# Patient Record
Sex: FEMALE | Race: WHITE | NOT HISPANIC OR LATINO | Employment: UNEMPLOYED | URBAN - NONMETROPOLITAN AREA
[De-identification: names, ages, dates, MRNs, and addresses within clinical notes are randomized per-mention and may not be internally consistent; named-entity substitution may affect disease eponyms.]

---

## 2023-05-02 ENCOUNTER — OFFICE VISIT (OUTPATIENT)
Dept: PRIMARY CARE | Facility: CLINIC | Age: 58
End: 2023-05-02

## 2023-05-02 VITALS
OXYGEN SATURATION: 96 % | WEIGHT: 235 LBS | SYSTOLIC BLOOD PRESSURE: 124 MMHG | DIASTOLIC BLOOD PRESSURE: 72 MMHG | HEART RATE: 82 BPM | TEMPERATURE: 97.7 F

## 2023-05-02 DIAGNOSIS — E78.5 DYSLIPIDEMIA: ICD-10-CM

## 2023-05-02 DIAGNOSIS — Z86.73 HISTORY OF ISCHEMIC STROKE: Primary | ICD-10-CM

## 2023-05-02 PROCEDURE — 1036F TOBACCO NON-USER: CPT | Performed by: FAMILY MEDICINE

## 2023-05-02 PROCEDURE — 99203 OFFICE O/P NEW LOW 30 MIN: CPT | Performed by: FAMILY MEDICINE

## 2023-05-02 RX ORDER — ASPIRIN 81 MG/1
81 TABLET ORAL DAILY
COMMUNITY

## 2023-05-02 ASSESSMENT — ENCOUNTER SYMPTOMS
WHEEZING: 0
LIGHT-HEADEDNESS: 0
MYALGIAS: 0
DYSURIA: 0
NUMBNESS: 0
JOINT SWELLING: 0
DIARRHEA: 0
WEAKNESS: 0
NERVOUS/ANXIOUS: 0
HEMATURIA: 0
HEADACHES: 0
VOMITING: 0
SINUS PRESSURE: 0
FACIAL ASYMMETRY: 0
EYE DISCHARGE: 0
FLANK PAIN: 0
BLOOD IN STOOL: 0
SORE THROAT: 0
EYE ITCHING: 0
COUGH: 0
APPETITE CHANGE: 0
SLEEP DISTURBANCE: 0
NAUSEA: 0
ARTHRALGIAS: 0
UNEXPECTED WEIGHT CHANGE: 0
PALPITATIONS: 0
FEVER: 0
DIZZINESS: 0
RHINORRHEA: 0
ABDOMINAL PAIN: 0
CONSTIPATION: 0
ACTIVITY CHANGE: 0
DYSPHORIC MOOD: 0
SHORTNESS OF BREATH: 0

## 2023-05-02 NOTE — PATIENT INSTRUCTIONS
SEE IN 6 MONTHS   LIPIDS NEED FOLLOWED  LOWER FAT DIET AND DAILY EXERCISE  MORE FRUIT AND VEGETABLES

## 2023-05-02 NOTE — PROGRESS NOTES
"Subjective   Patient ID: Filomena Yu is a 58 y.o. female who presents for Results (Echo at [facility]).    HPI PATIENT HAD A STROKE ON 3-24TH  \"[facility]\" AND HAS AMNESIA OF THAT DAY   HEART MONITOR BACK /DR GASCA  NEEDS TO CALL TO SEE HIM (SHE IS SELF PAY )  ALSO HAD AN ECHO AND I REVIEWED THIS WITH THE PATIENT AND THERE IS NO PATENT FORAMEN /NO ATRIAL CLOTS AND NO VALVULAR HEART DISEASE   LIPIDS TO TOO HIGH/DIETARY CHANGES AND TAKE THE ASA    Review of Systems   Constitutional:  Negative for activity change, appetite change, fever and unexpected weight change.   HENT:  Negative for congestion, ear pain, postnasal drip, rhinorrhea, sinus pressure and sore throat.    Eyes:  Negative for discharge, itching and visual disturbance.   Respiratory:  Negative for cough, shortness of breath and wheezing.    Cardiovascular:  Negative for chest pain, palpitations and leg swelling.   Gastrointestinal:  Negative for abdominal pain, blood in stool, constipation, diarrhea, nausea and vomiting.   Endocrine: Negative for cold intolerance, heat intolerance and polyuria.   Genitourinary:  Negative for dysuria, flank pain and hematuria.   Musculoskeletal:  Negative for arthralgias, gait problem, joint swelling and myalgias.   Skin:  Negative for rash.   Allergic/Immunologic: Negative for environmental allergies and food allergies.   Neurological:  Negative for dizziness, syncope, facial asymmetry, weakness, light-headedness, numbness and headaches.        SEE HPI   Psychiatric/Behavioral:  Negative for dysphoric mood and sleep disturbance. The patient is not nervous/anxious.        Objective   /72   Pulse 82   Temp 36.5 °C (97.7 °F)   Wt 107 kg (235 lb)   SpO2 96%     Physical Exam  Vitals reviewed.   Constitutional:       Appearance: Normal appearance. She is obese.   HENT:      Head: Normocephalic and atraumatic.      Mouth/Throat:      Mouth: Mucous membranes are moist.   Eyes:      Extraocular Movements: " Extraocular movements intact.      Pupils: Pupils are equal, round, and reactive to light.   Cardiovascular:      Rate and Rhythm: Normal rate and regular rhythm.   Pulmonary:      Effort: Pulmonary effort is normal.      Breath sounds: Normal breath sounds.   Abdominal:      Palpations: Abdomen is soft. There is no mass.   Musculoskeletal:         General: Normal range of motion.      Cervical back: Normal range of motion and neck supple.   Skin:     General: Skin is warm and dry.   Neurological:      General: No focal deficit present.      Mental Status: She is alert and oriented to person, place, and time.      Cranial Nerves: No cranial nerve deficit.      Sensory: No sensory deficit.      Motor: No weakness.      Coordination: Coordination normal.      Gait: Gait normal.      Deep Tendon Reflexes: Reflexes normal.   Psychiatric:         Mood and Affect: Mood normal.         Behavior: Behavior normal.       Assessment/Plan   Diagnoses and all orders for this visit:  History of ischemic stroke  Dyslipidemia

## 2023-08-04 ENCOUNTER — OFFICE VISIT (OUTPATIENT)
Dept: PRIMARY CARE | Facility: CLINIC | Age: 58
End: 2023-08-04

## 2023-08-04 VITALS
TEMPERATURE: 98.5 F | DIASTOLIC BLOOD PRESSURE: 70 MMHG | SYSTOLIC BLOOD PRESSURE: 128 MMHG | OXYGEN SATURATION: 94 % | WEIGHT: 234 LBS | HEART RATE: 72 BPM

## 2023-08-04 DIAGNOSIS — R30.0 BURNING WITH URINATION: ICD-10-CM

## 2023-08-04 DIAGNOSIS — N30.01 ACUTE CYSTITIS WITH HEMATURIA: Primary | ICD-10-CM

## 2023-08-04 LAB
POC APPEARANCE, URINE: ABNORMAL
POC BILIRUBIN, URINE: NEGATIVE
POC BLOOD, URINE: ABNORMAL
POC COLOR, URINE: YELLOW
POC GLUCOSE, URINE: NEGATIVE MG/DL
POC KETONES, URINE: NEGATIVE MG/DL
POC LEUKOCYTES, URINE: ABNORMAL
POC NITRITE,URINE: NEGATIVE
POC PH, URINE: 6.5 PH
POC PROTEIN, URINE: NEGATIVE MG/DL
POC SPECIFIC GRAVITY, URINE: 1.01
POC UROBILINOGEN, URINE: 0.2 EU/DL

## 2023-08-04 PROCEDURE — 99213 OFFICE O/P EST LOW 20 MIN: CPT

## 2023-08-04 PROCEDURE — 87186 SC STD MICRODIL/AGAR DIL: CPT

## 2023-08-04 PROCEDURE — 87077 CULTURE AEROBIC IDENTIFY: CPT

## 2023-08-04 PROCEDURE — 1036F TOBACCO NON-USER: CPT

## 2023-08-04 PROCEDURE — 87086 URINE CULTURE/COLONY COUNT: CPT

## 2023-08-04 PROCEDURE — 81003 URINALYSIS AUTO W/O SCOPE: CPT

## 2023-08-04 RX ORDER — NITROFURANTOIN 25; 75 MG/1; MG/1
100 CAPSULE ORAL 2 TIMES DAILY
Qty: 10 CAPSULE | Refills: 0 | Status: SHIPPED | OUTPATIENT
Start: 2023-08-04 | End: 2023-08-09

## 2023-08-04 ASSESSMENT — ENCOUNTER SYMPTOMS
NAUSEA: 0
FREQUENCY: 1
SWEATS: 0
HEMATURIA: 1
VOMITING: 0
DYSURIA: 1
CHILLS: 0
FLANK PAIN: 0

## 2023-08-04 NOTE — PROGRESS NOTES
Subjective   Patient ID: Filomena Yu is a 58 y.o. female who presents for UTI (Filomena is here for a possible UTI. Just started yesterday. Does have burning with the end of urination, did have blood yesterday(not today)).  Subjective  Filomena Yu is a 58 y.o. female who complains of burning with urination and dysuria for 5 days. Patient does have a history of recurrent UTI or pyelonephritis.    @[facility]@    Objective  /70   Pulse 72   Temp 36.9 °C (98.5 °F)   Wt 106 kg (234 lb)   SpO2 94%    General: alert and oriented, in no acute distress  Abdomen: soft, non-tender, without masses or organomegaly  Back: CVA tenderness absent    Laboratory:   Urine dipstick shows +leukocytes.      Assessment/Plan  Diagnoses and associated orders for this visit:    · Acute cystitis with hematuria   nitrofurantoin, macrocrystal-monohydrate, (Macrobid) 100 mg capsule; Take 1 capsule (100 mg) by mouth 2 times a day for 5 days.    · Burning with urination   POCT UA Automated manually resulted   Urine Culture          Difficulty Urinating   This is a new problem. The current episode started yesterday. The problem occurs intermittently. The problem has been waxing and waning. The quality of the pain is described as burning. The pain is at a severity of 5/10. There has been no fever. She is Sexually active. There is No history of pyelonephritis. Associated symptoms include frequency and hematuria. Pertinent negatives include no chills, discharge, flank pain, hesitancy, nausea, possible pregnancy, sweats, urgency or vomiting.       Vitals:    08/04/23 1144   BP: 128/70   Pulse: 72   Temp: 36.9 °C (98.5 °F)   SpO2: 94%       Review of Systems   Constitutional:  Negative for chills.   Gastrointestinal:  Negative for nausea and vomiting.   Genitourinary:  Positive for dysuria, frequency and hematuria. Negative for flank pain, hesitancy and urgency.       Objective   Physical Exam    Assessment/Plan   Problem List Items Addressed This Visit     None  Visit Diagnoses       Acute cystitis with hematuria    -  Primary    Relevant Medications    nitrofurantoin, macrocrystal-monohydrate, (Macrobid) 100 mg capsule    Burning with urination        Relevant Orders    POCT UA Automated manually resulted (Completed)    Urine Culture                 Thank you for coming in today, please call my office if you have any concerns or questions.     Elijah MCRAE, CNP

## 2023-08-07 LAB — URINE CULTURE: ABNORMAL

## 2023-08-09 ENCOUNTER — TELEPHONE (OUTPATIENT)
Dept: PRIMARY CARE | Facility: CLINIC | Age: 58
End: 2023-08-09

## 2023-08-09 NOTE — TELEPHONE ENCOUNTER
Spoke with Filomena at 12:17PM. Verbalized understanding has finished antibiotics will contact if it comes back.

## 2023-08-09 NOTE — TELEPHONE ENCOUNTER
----- Message from CLEMENTINA Doran-CNP sent at 8/9/2023  9:16 AM EDT -----  Confirmation shows Macrobid is good for the infection.

## 2023-08-18 ENCOUNTER — TELEPHONE (OUTPATIENT)
Dept: PRIMARY CARE | Facility: CLINIC | Age: 58
End: 2023-08-18

## 2023-08-18 DIAGNOSIS — N30.01 ACUTE CYSTITIS WITH HEMATURIA: Primary | ICD-10-CM

## 2023-08-18 RX ORDER — NITROFURANTOIN 25; 75 MG/1; MG/1
100 CAPSULE ORAL 2 TIMES DAILY
Qty: 10 CAPSULE | Refills: 0 | Status: SHIPPED | OUTPATIENT
Start: 2023-08-18 | End: 2023-08-23

## 2023-08-18 NOTE — TELEPHONE ENCOUNTER
Filomena called stating that she feels her UTI is starting to come back and only has 5 days of antibiotics. Would like to know if you would call in a longer dose. States you advised her just to call and you would send more over.

## 2023-10-16 ENCOUNTER — TELEPHONE (OUTPATIENT)
Dept: PRIMARY CARE | Facility: CLINIC | Age: 58
End: 2023-10-16

## 2023-10-25 DIAGNOSIS — R94.6 ABNORMAL RESULTS OF THYROID FUNCTION STUDIES: ICD-10-CM

## 2023-10-25 DIAGNOSIS — E78.5 DYSLIPIDEMIA: ICD-10-CM

## 2023-10-25 NOTE — TELEPHONE ENCOUNTER
Spoke with Filomena She is asking for an order for labs. She will go to [facility] .  Please Call when order is in

## 2023-10-30 ENCOUNTER — APPOINTMENT (OUTPATIENT)
Dept: PRIMARY CARE | Facility: CLINIC | Age: 58
End: 2023-10-30

## 2023-11-03 ENCOUNTER — APPOINTMENT (OUTPATIENT)
Dept: PRIMARY CARE | Facility: CLINIC | Age: 58
End: 2023-11-03

## 2023-11-07 ENCOUNTER — OFFICE VISIT (OUTPATIENT)
Dept: PRIMARY CARE | Facility: CLINIC | Age: 58
End: 2023-11-07

## 2023-11-07 VITALS
SYSTOLIC BLOOD PRESSURE: 130 MMHG | TEMPERATURE: 98.2 F | OXYGEN SATURATION: 97 % | DIASTOLIC BLOOD PRESSURE: 84 MMHG | WEIGHT: 232 LBS | HEART RATE: 63 BPM

## 2023-11-07 DIAGNOSIS — E78.5 DYSLIPIDEMIA: Primary | ICD-10-CM

## 2023-11-07 DIAGNOSIS — E03.9 ACQUIRED HYPOTHYROIDISM: ICD-10-CM

## 2023-11-07 PROCEDURE — 99213 OFFICE O/P EST LOW 20 MIN: CPT | Performed by: FAMILY MEDICINE

## 2023-11-07 PROCEDURE — 1036F TOBACCO NON-USER: CPT | Performed by: FAMILY MEDICINE

## 2023-11-07 ASSESSMENT — ENCOUNTER SYMPTOMS
MYALGIAS: 0
WHEEZING: 0
HEMATURIA: 0
DIARRHEA: 0
UNEXPECTED WEIGHT CHANGE: 0
DYSPHORIC MOOD: 0
PALPITATIONS: 0
EYE DISCHARGE: 0
BLOOD IN STOOL: 0
SHORTNESS OF BREATH: 0
SINUS PRESSURE: 0
ARTHRALGIAS: 0
VOMITING: 0
EYE ITCHING: 0
SLEEP DISTURBANCE: 0
RHINORRHEA: 0
COUGH: 0
JOINT SWELLING: 0
WEAKNESS: 0
FEVER: 0
NUMBNESS: 0
ABDOMINAL PAIN: 0
FLANK PAIN: 0
DIZZINESS: 0
ACTIVITY CHANGE: 0
APPETITE CHANGE: 0
NAUSEA: 0
DYSURIA: 0
SORE THROAT: 0
HEADACHES: 0
CONSTIPATION: 0
LIGHT-HEADEDNESS: 0
NERVOUS/ANXIOUS: 0

## 2023-11-07 NOTE — PATIENT INSTRUCTIONS
Consider labs in 6 months at [place] and they are ordered    Discussed dietary changes of lower fat and more fruit and vegetables     MAMMOGRAM /MAMMOVAN AT THE [facility] DEPARTMENT [phone]

## 2023-11-07 NOTE — PROGRESS NOTES
"Subjective   Patient ID: Tanya Yu is a 58 y.o. female who presents for Follow-up (6 month follow up.  Lab results in Media.).    HPI feels good most days   TANYA IS SELF PAY SO A BATTERY OF LABS WERE DONE AT [place] AND SHE IS HERE TO DISCUSS THESE   IN GENERAL ABNORMAL LIPID PANEL INCREASED LDL   DECREASED GR IN THE 70\"S STG 2  BUT MOST IMPORTANTLY IN HER THYROID THE TSH >9 AND T4 IS LOW   AFTER DISCUSSION PATIENT WILL FOLLOW IN 6 MONTHS SINCE I TOLD HER I DID NOT THINK HER THYROID WAS REMEDIABLE OTHER THAN WITH MEDICATION WHICH IS DEFERS AT THIS TIME   HER CHOLESTEROL WOULD IMPROVE I SUSPECT WITH IMPROVED DIETARY MEASURES THAT ARE ALSO DISCUSSED     Review of Systems   Constitutional:  Negative for activity change, appetite change, fever and unexpected weight change.   HENT:  Negative for congestion, ear pain, postnasal drip, rhinorrhea, sinus pressure and sore throat.    Eyes:  Negative for discharge, itching and visual disturbance.   Respiratory:  Negative for cough, shortness of breath and wheezing.    Cardiovascular:  Negative for chest pain, palpitations and leg swelling.   Gastrointestinal:  Negative for abdominal pain, blood in stool, constipation, diarrhea, nausea and vomiting.   Endocrine: Negative for cold intolerance, heat intolerance and polyuria.   Genitourinary:  Negative for dysuria, flank pain and hematuria.   Musculoskeletal:  Negative for arthralgias, gait problem, joint swelling and myalgias.   Skin:  Negative for rash.   Allergic/Immunologic: Negative for environmental allergies and food allergies.   Neurological:  Negative for dizziness, syncope, weakness, light-headedness, numbness and headaches.   Psychiatric/Behavioral:  Negative for dysphoric mood and sleep disturbance. The patient is not nervous/anxious.        Objective   /84   Pulse 63   Temp 36.8 °C (98.2 °F)   Wt 105 kg (232 lb)   SpO2 97%     Physical Exam  Vitals and nursing note reviewed.   Constitutional:       " Appearance: Normal appearance. She is obese.   HENT:      Head: Normocephalic.      Mouth/Throat:      Mouth: Mucous membranes are moist.   Cardiovascular:      Rate and Rhythm: Normal rate and regular rhythm.      Pulses: Normal pulses.      Heart sounds: Normal heart sounds. No murmur heard.     No friction rub. No gallop.   Pulmonary:      Effort: Pulmonary effort is normal. No respiratory distress.      Breath sounds: Normal breath sounds. No wheezing.   Abdominal:      General: Bowel sounds are normal. There is no distension.      Palpations: Abdomen is soft.      Tenderness: There is no abdominal tenderness.   Musculoskeletal:         General: No deformity. Normal range of motion.   Skin:     General: Skin is warm and dry.      Capillary Refill: Capillary refill takes less than 2 seconds.   Neurological:      General: No focal deficit present.      Mental Status: She is alert and oriented to person, place, and time.   Psychiatric:         Mood and Affect: Mood normal.         Assessment/Plan   Diagnoses and all orders for this visit:  Dyslipidemia  -     Lipid Panel; Future  Acquired hypothyroidism  -     TSH with reflex to Free T4 if abnormal; Future